# Patient Record
Sex: FEMALE | Race: WHITE | Employment: FULL TIME | URBAN - METROPOLITAN AREA
[De-identification: names, ages, dates, MRNs, and addresses within clinical notes are randomized per-mention and may not be internally consistent; named-entity substitution may affect disease eponyms.]

---

## 2020-02-20 ENCOUNTER — OFFICE VISIT (OUTPATIENT)
Dept: ORTHOPEDICS | Facility: CLINIC | Age: 60
End: 2020-02-20
Payer: COMMERCIAL

## 2020-02-20 ENCOUNTER — ANCILLARY PROCEDURE (OUTPATIENT)
Dept: GENERAL RADIOLOGY | Facility: CLINIC | Age: 60
End: 2020-02-20
Attending: FAMILY MEDICINE
Payer: COMMERCIAL

## 2020-02-20 VITALS
SYSTOLIC BLOOD PRESSURE: 110 MMHG | BODY MASS INDEX: 24.62 KG/M2 | HEIGHT: 66 IN | DIASTOLIC BLOOD PRESSURE: 74 MMHG | WEIGHT: 153.2 LBS

## 2020-02-20 DIAGNOSIS — M79.641 RIGHT HAND PAIN: ICD-10-CM

## 2020-02-20 DIAGNOSIS — G56.01 CARPAL TUNNEL SYNDROME OF RIGHT WRIST: ICD-10-CM

## 2020-02-20 DIAGNOSIS — M18.11 PRIMARY OSTEOARTHRITIS OF FIRST CARPOMETACARPAL JOINT OF RIGHT HAND: ICD-10-CM

## 2020-02-20 DIAGNOSIS — M79.641 RIGHT HAND PAIN: Primary | ICD-10-CM

## 2020-02-20 PROCEDURE — 73130 X-RAY EXAM OF HAND: CPT | Mod: RT | Performed by: FAMILY MEDICINE

## 2020-02-20 PROCEDURE — 99204 OFFICE O/P NEW MOD 45 MIN: CPT | Performed by: FAMILY MEDICINE

## 2020-02-20 RX ORDER — MELOXICAM 15 MG/1
15 TABLET ORAL DAILY
Qty: 30 TABLET | Refills: 1 | Status: SHIPPED | OUTPATIENT
Start: 2020-02-20

## 2020-02-20 ASSESSMENT — MIFFLIN-ST. JEOR: SCORE: 1276.66

## 2020-02-20 NOTE — LETTER
2/20/2020         RE: Hanna Cottrell  [address] [facility] [id number]        Dear Colleague,    Thank you for referring your patient, Hanna Cottrell, to the [facility]. Please see a copy of my visit note below.    ASSESSMENT & PLAN  Patient Instructions     1. Right hand pain    2. Primary osteoarthritis of first carpometacarpal joint of right hand    3. Carpal tunnel syndrome of right wrist      -Patient has right thumb pain due to mild arthritis and mild carpal tunnel syndrome.  -Patient will start formal hand therapy home exercise program  -Patient will start meloxicam 15 mg p.o. daily for the next 2 to 3 weeks and then on an as-needed basis  -Patient will purchase over-the-counter carpal tunnel nighttime wrist brace as directed on [employer]  -Patient follow-up in 4 weeks for reevaluation and progression of activity  -Call direct clinic number [[phone]] at any time with questions or concerns.    Albert Yeo MD [facility] and [facility] Medicine  [facility] [place]          -----    SUBJECTIVE  Hanna Cottrell is a/an 59 year old Right handed female who is seen as a self referral for evaluation of right hand pain. The patient is seen by themselves.    Onset: 2 month(s) ago. Reports insidious onset without acute precipitating event.  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    Past Medical History:   Diagnosis Date     Mild stage angle recession glaucoma      Social History     Socioeconomic History     Marital status:       "Spouse name: None     Number of children: None     Years of education: None     Highest education level: None   Occupational History     None   Social Needs     Financial resource strain: None     Food insecurity:     Worry: None     Inability: None     Transportation needs:     Medical: None     Non-medical: None   Tobacco Use     Smoking status: Never Smoker     Smokeless tobacco: Never Used   Substance and Sexual Activity     Alcohol use: None     Drug use: None     Sexual activity: None   Lifestyle     Physical activity:     Days per week: None     Minutes per session: None     Stress: None   Relationships     Social connections:     Talks on phone: None     Gets together: None     Attends Church service: None     Active member of club or organization: None     Attends meetings of clubs or organizations: None     Relationship status: None     Intimate partner violence:     Fear of current or ex partner: None     Emotionally abused: None     Physically abused: None     Forced sexual activity: None   Other Topics Concern     None   Social History Narrative     None         Patient's past medical, surgical, social, and family histories were reviewed today and no changes are noted.    REVIEW OF SYSTEMS:  10 point ROS is negative other than symptoms noted above in HPI, Past Medical History or as stated below  Constitutional: NEGATIVE for fever, chills, change in weight  Skin: NEGATIVE for worrisome rashes, moles or lesions  GI/: NEGATIVE for bowel or bladder changes  Neuro: NEGATIVE for weakness, dizziness or paresthesias    OBJECTIVE:  /74 (BP Location: Right arm, Patient Position: Chair, Cuff Size: Adult Regular)   Ht 1.676 m (5' 6\")   Wt 69.5 kg (153 lb 3.2 oz)   BMI 24.73 kg/m      General: healthy, alert and in no distress  HEENT: no scleral icterus or conjunctival erythema  Skin: no suspicious lesions or rash. No jaundice.  CV: regular rhythm by palpation  Resp: normal respiratory effort without " conversational dyspnea   Psych: normal mood and affect  Gait: normal steady gait with appropriate coordination and balance  Neuro: normal light touch sensory exam of the bilateral hands.    MSK:  RIGHT HAND  Inspection:    No swelling or obvious deformity or asymmetry  Palpation:   Carpals: normal   Metacarpals: normal   Thumb: CMC   Fingers: normal  Range of Motion:    Full active flexion and extension at MCP, PIP, and DIP joints; normal finger cascade without malrotation.  Wrist pronation, supination, and ulnar/radial deviation normal.  Strength:    5/5  Special Tests:    Positive: Tinel's    Negative: thenar eminence wasting, hypothenar eminence wasting, Phalen's, Finkelstein's, flexor digitorum superficialis testing, flexor digitorum profundus testing    Independent visualization of the below image:  Recent Results (from the past 24 hour(s))   XR Hand Right G/E 3 Views    Narrative    X-rays taken office today of the right hand shows mild first CMC   degenerative changes.  No acute fracture, dislocation.             Albert Yeo MD [facility]      Again, thank you for allowing me to participate in the care of your patient.        Sincerely,        Albert Yeo, MD

## 2020-02-20 NOTE — PROGRESS NOTES
ASSESSMENT & PLAN  Patient Instructions     1. Right hand pain    2. Primary osteoarthritis of first carpometacarpal joint of right hand    3. Carpal tunnel syndrome of right wrist      -Patient has right thumb pain due to mild arthritis and mild carpal tunnel syndrome.  -Patient will start formal hand therapy home exercise program  -Patient will start meloxicam 15 mg p.o. daily for the next 2 to 3 weeks and then on an as-needed basis  -Patient will purchase over-the-counter carpal tunnel nighttime wrist brace as directed on [employer]  -Patient follow-up in 4 weeks for reevaluation and progression of activity  -Call direct clinic number [[phone]] at any time with questions or concerns.    Albert Yeo MD [facility] Orthopedics and Sports Medicine  [facility]          -----    SUBJECTIVE  Hanna Cottrell is a/an 59 year old Right handed female who is seen as a self referral for evaluation of right hand pain. The patient is seen by themselves.    Onset: 2 month(s) ago. Reports insidious onset without acute precipitating event.  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    Past Medical History:   Diagnosis Date     Mild stage angle recession glaucoma      Social History     Socioeconomic History     Marital status:      Spouse name: None     Number of children: None     Years of education: None     Highest education level: None   Occupational History     None   Social Needs     Financial resource strain: None     Food insecurity:     Worry: None     Inability: None      "Transportation needs:     Medical: None     Non-medical: None   Tobacco Use     Smoking status: Never Smoker     Smokeless tobacco: Never Used   Substance and Sexual Activity     Alcohol use: None     Drug use: None     Sexual activity: None   Lifestyle     Physical activity:     Days per week: None     Minutes per session: None     Stress: None   Relationships     Social connections:     Talks on phone: None     Gets together: None     Attends Confucianist service: None     Active member of club or organization: None     Attends meetings of clubs or organizations: None     Relationship status: None     Intimate partner violence:     Fear of current or ex partner: None     Emotionally abused: None     Physically abused: None     Forced sexual activity: None   Other Topics Concern     None   Social History Narrative     None         Patient's past medical, surgical, social, and family histories were reviewed today and no changes are noted.    REVIEW OF SYSTEMS:  10 point ROS is negative other than symptoms noted above in HPI, Past Medical History or as stated below  Constitutional: NEGATIVE for fever, chills, change in weight  Skin: NEGATIVE for worrisome rashes, moles or lesions  GI/: NEGATIVE for bowel or bladder changes  Neuro: NEGATIVE for weakness, dizziness or paresthesias    OBJECTIVE:  /74 (BP Location: Right arm, Patient Position: Chair, Cuff Size: Adult Regular)   Ht 1.676 m (5' 6\")   Wt 69.5 kg (153 lb 3.2 oz)   BMI 24.73 kg/m     General: healthy, alert and in no distress  HEENT: no scleral icterus or conjunctival erythema  Skin: no suspicious lesions or rash. No jaundice.  CV: regular rhythm by palpation  Resp: normal respiratory effort without conversational dyspnea   Psych: normal mood and affect  Gait: normal steady gait with appropriate coordination and balance  Neuro: normal light touch sensory exam of the bilateral hands.    MSK:  RIGHT HAND  Inspection:    No swelling or obvious deformity " or asymmetry  Palpation:   Carpals: normal   Metacarpals: normal   Thumb: CMC   Fingers: normal  Range of Motion:    Full active flexion and extension at MCP, PIP, and DIP joints; normal finger cascade without malrotation.  Wrist pronation, supination, and ulnar/radial deviation normal.  Strength:    5/5  Special Tests:    Positive: Tinel's    Negative: thenar eminence wasting, hypothenar eminence wasting, Phalen's, Finkelstein's, flexor digitorum superficialis testing, flexor digitorum profundus testing    Independent visualization of the below image:  Recent Results (from the past 24 hour(s))   XR Hand Right G/E 3 Views    Narrative    X-rays taken office today of the right hand shows mild first CMC   degenerative changes.  No acute fracture, dislocation.             Albert Yeo MD [facility] Orthopedic [facility]

## 2020-02-20 NOTE — TELEPHONE ENCOUNTER
Please deny request, Dr. Yeo does not want her to be on it that long. He wants her to come off of it.    Talita King ATC

## 2020-02-21 RX ORDER — MELOXICAM 15 MG/1
TABLET ORAL
Qty: 90 TABLET | OUTPATIENT
Start: 2020-02-21

## 2020-02-28 ENCOUNTER — THERAPY VISIT (OUTPATIENT)
Dept: OCCUPATIONAL THERAPY | Facility: CLINIC | Age: 60
End: 2020-02-28
Payer: COMMERCIAL

## 2020-02-28 DIAGNOSIS — M18.11 PRIMARY OSTEOARTHRITIS OF FIRST CARPOMETACARPAL JOINT OF RIGHT HAND: ICD-10-CM

## 2020-02-28 DIAGNOSIS — M79.641 HAND PAIN, RIGHT: ICD-10-CM

## 2020-02-28 DIAGNOSIS — G56.01 CARPAL TUNNEL SYNDROME OF RIGHT WRIST: ICD-10-CM

## 2020-02-28 PROCEDURE — 97110 THERAPEUTIC EXERCISES: CPT | Mod: GO | Performed by: OCCUPATIONAL THERAPIST

## 2020-02-28 PROCEDURE — 97165 OT EVAL LOW COMPLEX 30 MIN: CPT | Mod: GO | Performed by: OCCUPATIONAL THERAPIST

## 2020-02-28 NOTE — PROGRESS NOTES
Hand Therapy Initial Evaluation  Current Date:  2/28/2020    Subjective:  Hanna Cottrell is a 59 year old R hand dominant female.    Diagnosis:R CMC OA, and CTS  DOI:  December 2019  MD order date: 2/20/20    Patient Entered Health History - See Therapist Evaluation below  Hanna Cottrell being seen for Hand therapy.       Problem occurred: repetitive use   General health as reported by patient is good.  Pertinent medical history includes: cancer.     Medical allergies: latex.   Surgeries include:  Cancer surgery.    Current medications:  Sleep medication, thyroid medication and other. Other medications details: Eye drops.    Current occupation is .                     Patient reports symptoms of pain of the R thumb and wrist which occurred due to use over time. Since onset symptoms are unchanged. Special tests:  x-ray.  Previous treatment: OTC splint. General health as reported by patient is good.   Medication history: Thyroid, Meloxicam     Occupational Profile Information:  Prior functional level:  no limitations  Transportation: drives  Currently working in normal job without restrictions  Leisure activities/hobbies: golf, piano    Functional Outcome Measure:   Upper Extremity Functional Index Score:  SCORE:   Column Totals: /80: (P) 80   (A lower score indicates greater disability.)    Objective:  Pain Level (Scale 0-10):   2/28/2020   At Rest 1-2/10   With Use 5-6/10     Pain Description:  Date 2/28/2020   Location Thenars, CMC, radial wrist   Pain Quality Aching and Burning   Frequency intermittent     Pain is worst  daytime, was waking prior to meloxicam mediaction   Exacerbated by  Twisting opening jars, placing inserts into shoes   Relieved by Meloxicam, otc medication   Progression Better with medication, but otherwise was unchanged     Posture  Normal    Edema  None    Sensation  WNL throughout all nerve distributions; per patient report    ROM  Pain Report: - none  + mild    ++ moderate     +++ severe   Wrist 2/28/2020 2/28/2020   AROM (PROM) L R   Extension 66 70   Flexion 80 85   RD 12 10   UD 40 35     Thumb Range of Motion:  AROM (PROM)  Date 2/28/2020 2/28/2020   Side: L R   MCP ext 0 0   MCP flex 53 52   IP ext 0 0   IP flex 60 52   RABD 35 35   PABD 31 40   UD with thumb flexion 32 26     Tenderness:  Date 2/28/2020   R CMC of the Thumb -   L CMC of the Thumb NT       Appearance:  Date 2/28/2020   Side R   Shoulder deformity is present over the CMC -   Volar subluxation present -   Edema over the CMC joint -   Noted collapse of MP into hyperextension during pinch +     Special Tests  Pain Report:  - none    + mild    ++ moderate    +++ severe    2/28/2020   Median Nerve Compression at Pronator NT   Carpal Compression Test--Durkan Test (30 sec) NT   Barker Test for Lumbrical Incursion  (fist x 30 secs) NT   Tinels at Carpal Tunnel -   Phalens -   Finkelstein's 4/10     Neural Tension Testing  MNT: Median Neurodynamic Test (based on DS Lilly's ULNT)   2/28/2020   0-5 Scale NT   Position:   0/5: Arm across abdomen in coronal plane  1/5: Depress shoulder, ER to neutral ABD shoulder to 45 degrees  2/5: ER shoulder to end range, keep elbow at 90 degrees  3/5: Extend elbow to 0 degrees  4/5: Fully supinate forearm  5/5: Extend wrist, fingers and thumb  Notes:    (+) indicates beyond grade level but less than assisted to next level    (-) indicates over assisted to level    S1  onset/change of patient's symptoms    S2 definite stop point based on patient's discomfort level    Resisted Testing: MMT on scale 0-5/5, Pain level report on scale 0-10/10  Date 2/28/2020    Side R    WHAT test 2/10    EPL 0/10    FCR Slight burning in thumb    Wrist Ext 0/10    Wrist Flex 0/10    Radial Deviation 0/10    Ulnar Deviation 2/10    APL 2/10    EPB 1/10          Strength   (Measured in pounds)  Pain Report: - none  + mild    ++ moderate    +++ severe    2/28/2020 2/28/2020   Trials L R   1  2  3 29  39 40  48    Average       Lat Pinch 2/28/2020 2/28/2020   Trials L R   1  2  3 10 9 (2/10)   Average       3 Pt Pinch 2/28/2020 2/28/2020   Trials L R   1  2  3 10 11 (2/10)   Average         Assessment:  Patient presents with symptoms consistent with diagnosis of right thumb pain, with conservative intervention.     Patient's limitations or Problem List includes:  Pain, Decreased ROM/motion, Decreased pinch and Tightness in musculature of the right wrist and thumb which interferes with the patient's ability to perform Recreational Activities and Household Chores as compared to previous level of function.    Rehab Potential:  Excellent - Return to full activity, no limitations    Patient will benefit from skilled Occupational Therapy to increase ROM, pinch strength and stability of thumb and decrease pain to return to previous activity level and resume normal daily tasks and to reach their rehab potential.    Barriers to Learning:  No barrier    Communication Issues:  Patient appears to be able to clearly communicate and understand verbal and written communication and follow directions correctly.    Chart Review: Chart Review, Brief history including review of medical and/or therapy records relating to the presenting problem and Simple history review with patient    Identified Performance Deficits: dressing, health management and maintenance, home establishment and management, meal preparation and cleanup and leisure activities    Assessment of Occupational Performance:  5 or more Performance Deficits    Clinical Decision Making (Complexity): Low complexity    Treatment Explanation:  The following has been discussed with the patient:  RX ordered/plan of care  Anticipated outcomes  Possible risks and side effects    Plan:  Frequency:  1 X week, once daily  Duration:  for 6 weeks    Treatment Plan:    Modalities:    US   Therapeutic Exercise:    AROM, AAROM, PROM, Tendon Gliding, Isotonics, Isometrics and  Stabilization  Neuromuscular re-ed:   Nerve Gliding and Kinesiotaping  Manual Techniques:   Joint mobilization, Friction massage and Myofascial release  Orthotic Fabrication:    Static orthosis  Self Care:    Ergonomic Considerations    Discharge Plan:  Achieve all LTG.  Independent in home treatment program.  Reach maximal therapeutic benefit.    Home Exercise Program:  Ice/warmth  OTC splint at night  CMC stability program, isometric C, AROM 1st DI  Dequervain's stretch    Next Visit:  STM  Progress CMC stability  Consider orthosis

## 2020-03-05 ENCOUNTER — THERAPY VISIT (OUTPATIENT)
Dept: OCCUPATIONAL THERAPY | Facility: CLINIC | Age: 60
End: 2020-03-05
Payer: COMMERCIAL

## 2020-03-05 DIAGNOSIS — M79.641 HAND PAIN, RIGHT: ICD-10-CM

## 2020-03-05 PROCEDURE — 97140 MANUAL THERAPY 1/> REGIONS: CPT | Mod: GO | Performed by: OCCUPATIONAL THERAPIST

## 2020-03-05 PROCEDURE — 97110 THERAPEUTIC EXERCISES: CPT | Mod: GO | Performed by: OCCUPATIONAL THERAPIST

## 2020-03-12 ENCOUNTER — THERAPY VISIT (OUTPATIENT)
Dept: OCCUPATIONAL THERAPY | Facility: CLINIC | Age: 60
End: 2020-03-12
Payer: COMMERCIAL

## 2020-03-12 DIAGNOSIS — M79.641 HAND PAIN, RIGHT: ICD-10-CM

## 2020-03-12 PROCEDURE — 97110 THERAPEUTIC EXERCISES: CPT | Mod: GO | Performed by: OCCUPATIONAL THERAPIST

## 2020-03-12 PROCEDURE — 97140 MANUAL THERAPY 1/> REGIONS: CPT | Mod: GO | Performed by: OCCUPATIONAL THERAPIST

## 2020-03-19 ENCOUNTER — VIRTUAL VISIT (OUTPATIENT)
Dept: ORTHOPEDICS | Facility: CLINIC | Age: 60
End: 2020-03-19
Payer: COMMERCIAL

## 2020-03-19 VITALS — HEIGHT: 66 IN | WEIGHT: 153 LBS | BODY MASS INDEX: 24.59 KG/M2 | TEMPERATURE: 98.6 F

## 2020-03-19 DIAGNOSIS — G56.01 CARPAL TUNNEL SYNDROME OF RIGHT WRIST: ICD-10-CM

## 2020-03-19 DIAGNOSIS — M18.11 PRIMARY OSTEOARTHRITIS OF FIRST CARPOMETACARPAL JOINT OF RIGHT HAND: Primary | ICD-10-CM

## 2020-03-19 PROCEDURE — 99442 ZZC PHYSICIAN TELEPHONE EVALUATION 11-20 MIN: CPT | Performed by: FAMILY MEDICINE

## 2020-03-19 ASSESSMENT — MIFFLIN-ST. JEOR: SCORE: 1270.75

## 2020-03-19 NOTE — PATIENT INSTRUCTIONS
1. Primary osteoarthritis of first carpometacarpal joint of right hand    2. Carpal tunnel syndrome of right wrist      -Patient is following up for right wrist pain due to arthritis at the base of the thumb and carpal tunnel syndrome  -Patient reports improvement in symptoms with physical therapy, oral anti-inflammatories and bracing at night  -Patient will continue with meloxicam as needed.  Patient will continue with nighttime bracing and occasional bracing during the daytime as needed  -Patient had another physical therapy appointment for today but canceled.  I advised that she not go to any further therapy appointments and simply continue with her home exercises.  -Patient will call our office if his symptoms return.  -Patient may return to clinic for an injection if symptoms worsen once we are seeing nonurgent patients face-to-face again.  -We are currently aiming to open the clinic on 4/20/2020 for nonurgent visits.  -Call direct clinic number [[phone]] at any time with questions or concerns.    Albert Yeo MD [facility]  [facility] [place]

## 2020-03-19 NOTE — PROGRESS NOTES
"Hanna Cottrell is a 60 year old female who is being evaluated via a billable telephone visit.      The patient has been notified of following:     \"This telephone visit will be conducted via a call between you and your physician/provider. We have found that certain health care needs can be provided without the need for a physical exam.  This service lets us provide the care you need with a short phone conversation.  If a prescription is necessary we can send it directly to your pharmacy.  If lab work is needed we can place an order for that and you can then stop by our lab to have the test done at a later time.    If during the course of the call the physician/provider feels a telephone visit is not appropriate, you will not be charged for this service.\"     Hanna Cottrell complains of   Chief Complaint   Patient presents with     Pain       I have reviewed and updated the patient's Past Medical History, Social History, Family History and Medication List.    ALLERGIES  Patient has no known allergies.        Phone call duration: 16 minutes      ASSESSMENT & PLAN  Patient Instructions     1. Primary osteoarthritis of first carpometacarpal joint of right hand    2. Carpal tunnel syndrome of right wrist      -Patient is following up for right wrist pain due to arthritis at the base of the thumb and carpal tunnel syndrome  -Patient reports improvement in symptoms with physical therapy, oral anti-inflammatories and bracing at night  -Patient will continue with meloxicam as needed.  Patient will continue with nighttime bracing and occasional bracing during the daytime as needed  -Patient had another physical therapy appointment for today but canceled.  I advised that she not go to any further therapy appointments and simply continue with her home exercises.  -Patient will call our office if his symptoms return.  -Patient may return to clinic for an injection if symptoms worsen once we are seeing nonurgent patients " face-to-face again.  -We are currently aiming to open the clinic on 4/20/2020 for nonurgent visits.  -Call direct clinic number [[phone]] at any time with questions or concerns.    Albert Yeo MD [facility] and Sports Medicine  [facility] [place]          -----    SUBJECTIVE:  Hanna Cottrell is a 60 year old female who is seen in follow-up for right thumb pain due to mild arthritis and mild carpal tunnel syndrome..They were last seen 2/20/2020.     Since their last visit reports 70% improvement.  has been going to physical therapy and that is going well.  has more increased range of motion and decreased pain.  only took the meloxicam for 2 weeks. Is currently still wearing the carpel tunnel wrist brace at night. They indicate that their current pain level is 2/10. Harris has some discomfort around the scaphoid.  They have tried home exercises, physical therapy (3 visits) and casting/splinting/bracing.      The patient is seen by themselves.    Patient's past medical, surgical, social, and family histories were reviewed today and no changes are noted.    REVIEW OF SYSTEMS:  Constitutional: NEGATIVE for fever, chills, change in weight  Skin: NEGATIVE for worrisome rashes, moles or lesions  GI/: NEGATIVE for bowel or bladder changes  Neuro: NEGATIVE for weakness, dizziness or paresthesias        Albert Yeo MD, [facility]

## 2020-03-22 ENCOUNTER — HEALTH MAINTENANCE LETTER (OUTPATIENT)
Age: 60
End: 2020-03-22

## 2020-04-02 PROBLEM — M79.641 HAND PAIN, RIGHT: Status: RESOLVED | Noted: 2020-02-28 | Resolved: 2020-04-02

## 2020-04-02 NOTE — PROGRESS NOTES
Discharge Summary - Hand Therapy    Per MD, Discharge Hand Therapy at this time.  Pt to continue HEP independently.

## 2020-09-29 NOTE — PATIENT INSTRUCTIONS
1. Right hand pain    2. Primary osteoarthritis of first carpometacarpal joint of right hand    3. Carpal tunnel syndrome of right wrist      -Patient has right thumb pain due to mild arthritis and mild carpal tunnel syndrome.  -Patient will start formal hand therapy home exercise program  -Patient will start meloxicam 15 mg p.o. daily for the next 2 to 3 weeks and then on an as-needed basis  -Patient will purchase over-the-counter carpal tunnel nighttime wrist brace as directed on [employer]  -Patient follow-up in 4 weeks for reevaluation and progression of activity  -Call direct clinic number [[phone]] at any time with questions or concerns.    Albert Yeo MD [facility]  [facility] [place]        
---

## 2020-12-18 DIAGNOSIS — M79.641 RIGHT HAND PAIN: ICD-10-CM

## 2020-12-18 RX ORDER — MELOXICAM 15 MG/1
TABLET ORAL
Qty: 30 TABLET | Refills: 0 | OUTPATIENT
Start: 2020-12-18

## 2020-12-18 NOTE — TELEPHONE ENCOUNTER
Patient returned call and did not request a refill. Takes Meloxicam only occasionally and has some left.   Will deny from pharmacy.     MARSHAL Carballo RN

## 2020-12-18 NOTE — TELEPHONE ENCOUNTER
LVM for patient to return call. Needing to know if she would like the rx filled. If she does she will need to make a follow up since she hasn't been seem since February.    Talita King ATC

## 2021-01-15 ENCOUNTER — HEALTH MAINTENANCE LETTER (OUTPATIENT)
Age: 61
End: 2021-01-15

## 2021-05-09 ENCOUNTER — HEALTH MAINTENANCE LETTER (OUTPATIENT)
Age: 61
End: 2021-05-09

## 2021-10-24 ENCOUNTER — HEALTH MAINTENANCE LETTER (OUTPATIENT)
Age: 61
End: 2021-10-24

## 2022-04-10 ENCOUNTER — HEALTH MAINTENANCE LETTER (OUTPATIENT)
Age: 62
End: 2022-04-10

## 2022-06-05 ENCOUNTER — HEALTH MAINTENANCE LETTER (OUTPATIENT)
Age: 62
End: 2022-06-05

## 2022-10-16 ENCOUNTER — HEALTH MAINTENANCE LETTER (OUTPATIENT)
Age: 62
End: 2022-10-16

## 2023-06-01 ENCOUNTER — HEALTH MAINTENANCE LETTER (OUTPATIENT)
Age: 63
End: 2023-06-01

## 2024-06-01 ENCOUNTER — HEALTH MAINTENANCE LETTER (OUTPATIENT)
Age: 64
End: 2024-06-01